# Patient Record
Sex: MALE | Race: WHITE | Employment: FULL TIME | URBAN - METROPOLITAN AREA
[De-identification: names, ages, dates, MRNs, and addresses within clinical notes are randomized per-mention and may not be internally consistent; named-entity substitution may affect disease eponyms.]

---

## 2017-08-27 ENCOUNTER — ANESTHESIA EVENT (OUTPATIENT)
Dept: SURGERY | Facility: HOSPITAL | Age: 42
DRG: 133 | End: 2017-08-27
Payer: COMMERCIAL

## 2017-08-27 NOTE — ANESTHESIA PREPROCEDURE EVALUATION
PRE-OP EVALUATION    Patient Name: Amanda Garcia    Pre-op Diagnosis: OBSTRUCTIVE SLEEP APNEA, CHRONIC TONSILLITIS, HYPERTROPHIC TONSILS AND ADENOIDS    Procedure(s):  UVULOPALATOPHARYNGOPLASTY WITH LASER, ADENOIDECTOMY AND BILATERAL LASER TONSILLECTOMY exam normal.                 Other findings  Pt falling asleep midsentence! ASA: 3   Plan: general  NPO status verified and   Patient has not taken beta blockers in last 24 hours.   Post-procedure pain management plan discussed with surgeon and pat

## 2017-08-28 ENCOUNTER — HOSPITAL ENCOUNTER (OUTPATIENT)
Facility: HOSPITAL | Age: 42
Setting detail: OBSERVATION
Discharge: HOME | DRG: 133 | End: 2017-08-29
Attending: OTOLARYNGOLOGY | Admitting: OTOLARYNGOLOGY
Payer: COMMERCIAL

## 2017-08-28 ENCOUNTER — SURGERY (OUTPATIENT)
Age: 42
End: 2017-08-28

## 2017-08-28 ENCOUNTER — ANESTHESIA (OUTPATIENT)
Dept: SURGERY | Facility: HOSPITAL | Age: 42
DRG: 133 | End: 2017-08-28
Payer: COMMERCIAL

## 2017-08-28 DIAGNOSIS — G47.33 OSA (OBSTRUCTIVE SLEEP APNEA): Primary | ICD-10-CM

## 2017-08-28 LAB
ALLENS TEST: POSITIVE
ALLENS TEST: POSITIVE
ARTERIAL BLD GAS O2 SATURATION: 93 % (ref 92–100)
ARTERIAL BLD GAS O2 SATURATION: 95 % (ref 92–100)
ARTERIAL BLOOD GAS BASE EXCESS: 1.2
ARTERIAL BLOOD GAS BASE EXCESS: 1.6
ARTERIAL BLOOD GAS HCO3: 29.1 MEQ/L (ref 22–26)
ARTERIAL BLOOD GAS HCO3: 29.4 MEQ/L (ref 22–26)
ARTERIAL BLOOD GAS PCO2: 55 MM HG (ref 35–45)
ARTERIAL BLOOD GAS PCO2: 61 MM HG (ref 35–45)
ARTERIAL BLOOD GAS PH: 7.3 (ref 7.35–7.45)
ARTERIAL BLOOD GAS PH: 7.34 (ref 7.35–7.45)
ARTERIAL BLOOD GAS PO2: 88 MM HG (ref 80–105)
ARTERIAL BLOOD GAS PO2: 94 MM HG (ref 80–105)
ATRIAL RATE: 88 BPM
CALCULATED O2 SATURATION: 95 % (ref 92–100)
CALCULATED O2 SATURATION: 97 % (ref 92–100)
CARBOXYHEMOGLOBIN: 1.5 % SAT (ref 0–3)
CARBOXYHEMOGLOBIN: 1.5 % SAT (ref 0–3)
CPAP: 12 CM H2O
EXPIRATORY PRESSURE: 10 CM H2O
FIO2: 30 %
INSP PRESSURE: 16 CM H2O
L/M: 6 L/MIN
METHEMOGLOBIN: 0.7 % SAT (ref 0.4–1.5)
METHEMOGLOBIN: 0.7 % SAT (ref 0.4–1.5)
P AXIS: -11 DEGREES
P-R INTERVAL: 134 MS
PATIENT TEMPERATURE: 97.7 F
PATIENT TEMPERATURE: 99 F
Q-T INTERVAL: 396 MS
QRS DURATION: 110 MS
QTC CALCULATION (BEZET): 479 MS
R AXIS: 77 DEGREES
T AXIS: 53 DEGREES
TOTAL HEMOGLOBIN: 16.7 G/DL (ref 13.2–17.3)
TOTAL HEMOGLOBIN: 17.3 G/DL (ref 13.2–17.3)
VENTRICULAR RATE: 88 BPM

## 2017-08-28 PROCEDURE — 0C53XZZ DESTRUCTION OF SOFT PALATE, EXTERNAL APPROACH: ICD-10-PCS | Performed by: OTOLARYNGOLOGY

## 2017-08-28 PROCEDURE — 0CBNXZZ EXCISION OF UVULA, EXTERNAL APPROACH: ICD-10-PCS | Performed by: OTOLARYNGOLOGY

## 2017-08-28 PROCEDURE — 99223 1ST HOSP IP/OBS HIGH 75: CPT | Performed by: HOSPITALIST

## 2017-08-28 PROCEDURE — 0C5PXZZ DESTRUCTION OF TONSILS, EXTERNAL APPROACH: ICD-10-PCS | Performed by: OTOLARYNGOLOGY

## 2017-08-28 RX ORDER — SODIUM CHLORIDE, SODIUM LACTATE, POTASSIUM CHLORIDE, CALCIUM CHLORIDE 600; 310; 30; 20 MG/100ML; MG/100ML; MG/100ML; MG/100ML
INJECTION, SOLUTION INTRAVENOUS CONTINUOUS
Status: DISCONTINUED | OUTPATIENT
Start: 2017-08-28 | End: 2017-08-29

## 2017-08-28 RX ORDER — LIDOCAINE HYDROCHLORIDE AND EPINEPHRINE 10; 10 MG/ML; UG/ML
INJECTION, SOLUTION INFILTRATION; PERINEURAL AS NEEDED
Status: DISCONTINUED | OUTPATIENT
Start: 2017-08-28 | End: 2017-08-28 | Stop reason: HOSPADM

## 2017-08-28 RX ORDER — LABETALOL HYDROCHLORIDE 5 MG/ML
INJECTION, SOLUTION INTRAVENOUS
Status: COMPLETED
Start: 2017-08-28 | End: 2017-08-28

## 2017-08-28 RX ORDER — LABETALOL HYDROCHLORIDE 5 MG/ML
5 INJECTION, SOLUTION INTRAVENOUS
Status: DISCONTINUED | OUTPATIENT
Start: 2017-08-28 | End: 2017-08-28 | Stop reason: HOSPADM

## 2017-08-28 RX ORDER — NALOXONE HYDROCHLORIDE 0.4 MG/ML
80 INJECTION, SOLUTION INTRAMUSCULAR; INTRAVENOUS; SUBCUTANEOUS AS NEEDED
Status: DISCONTINUED | OUTPATIENT
Start: 2017-08-28 | End: 2017-08-28 | Stop reason: HOSPADM

## 2017-08-28 RX ORDER — ENOXAPARIN SODIUM 100 MG/ML
0.5 INJECTION SUBCUTANEOUS NIGHTLY
Status: DISCONTINUED | OUTPATIENT
Start: 2017-08-28 | End: 2017-08-29

## 2017-08-28 RX ORDER — ACETAMINOPHEN 160 MG/5ML
650 SOLUTION ORAL EVERY 6 HOURS PRN
Status: DISCONTINUED | OUTPATIENT
Start: 2017-08-28 | End: 2017-08-29

## 2017-08-28 RX ORDER — HYDRALAZINE HYDROCHLORIDE 20 MG/ML
10 INJECTION INTRAMUSCULAR; INTRAVENOUS EVERY 4 HOURS PRN
Status: DISCONTINUED | OUTPATIENT
Start: 2017-08-28 | End: 2017-08-29

## 2017-08-28 RX ORDER — SODIUM CHLORIDE, SODIUM LACTATE, POTASSIUM CHLORIDE, CALCIUM CHLORIDE 600; 310; 30; 20 MG/100ML; MG/100ML; MG/100ML; MG/100ML
INJECTION, SOLUTION INTRAVENOUS CONTINUOUS
Status: DISCONTINUED | OUTPATIENT
Start: 2017-08-28 | End: 2017-08-28 | Stop reason: HOSPADM

## 2017-08-28 NOTE — OPERATIVE REPORT
[facility]    PATIENT'S NAME: SHEFALI ROGERSANNE   ATTENDING PHYSICIAN: Elvin Jordan M.D. OPERATING PHYSICIAN: Elvin Jordan M.D.    PATIENT ACCOUNT#:   [de-identified]    LOCATION:  [address] [place] [address]  MEDICAL RECORD #:   [id number]       DATE OF BIRTH: M.D.  d: 08/28/2017 09:13:04  t: 08/28/2017 09:58:37  [facility] [id number]  [facility]/

## 2017-08-28 NOTE — BRIEF OP NOTE
Pre-Operative Diagnosis: OBSTRUCTIVE SLEEP APNEA, CHRONIC TONSILLITIS, HYPERTROPHIC TONSILS AND ADENOIDS     Post-Operative Diagnosis: OBSTRUCTIVE SLEEP APNEA, CHRONIC TONSILLITIS, HYPERTROPHIC TONSILS AND ADENOIDS     Procedure Performed:   Procedure(s

## 2017-08-28 NOTE — PROGRESS NOTES
Patient to transfer to ICU, bed is available at this time. Dr. Antonia Robles covering for Dr. Chisholm, contact has been made  Patient calm/cooperative, moderate pain level, will continue to monitor.

## 2017-08-28 NOTE — CONSULTS
[facility]  [address] [place] [address] Patient Status:  Inpatient    1975 MRN [id number]   [facility] 4SW-A Attending Yael Dixon MD   Hosp Day # 0 PCP No primary care provider on file.      Reason for consult: Medical ysabel PERRLA. Anicteric. Neck: No lymphadenopathy. No JVD. No carotid bruits. Respiratory: Diminished BS b/l  Cardiovascular: S1, S2. Regular rate and rhythm. No murmurs, rubs or gallops. Equal pulses. Chest and Back: No tenderness or deformity. Abdomen:  So

## 2017-08-28 NOTE — H&P
History & Physical Examination    Patient Name: Francisca Donahue  MRN: [id number]  CSN: [id number]  YOB: 1975    Diagnosis: JOCELYN    Present Illness: Patient with severe sleep apnea here for UPPP Laser Tonsillectomy and adneoidectomy.        No prescri

## 2017-08-28 NOTE — ANESTHESIA POSTPROCEDURE EVALUATION
Reema RODAS 67. Atef Patient Status:  Hospital Outpatient Surgery   Age/Gender 37year old male MRN [id number]   [facility] SURGERY Attending Daniele [place] MD   Hosp Day # 0 PCP No primary care provider on file.        Anesthesia

## 2017-08-28 NOTE — CONSULTS
[facility]  Report of Consultation    Best Meyers Patient Status:  Inpatient    1975 MRN [id number]   [facility] 4SW-A Attending Angie Kennedy MD   Hosp Day # 0 PCP No primary care provider on file.      Reason for Consultati pain.  Integument/breast: Negative for rash, skin lesions, and pruritus. Hematologic/lymphatic: Negative for easy bruising, bleeding, and lymphadenopathy. Musculoskeletal: Negative for myalgias, arthralgias, muscle weakness.   Neurological: Negative for h soft, non-tender, non-distended, no masses, no guarding, no     rebound, positive BS. Obese   Extremity: no edema, no cyanosis   Skin: No rashes or lesions.    Neurological: Alert, interactive, no focal deficits    Lab Data Review:  No results for input(s)

## 2017-08-29 VITALS
HEART RATE: 105 BPM | TEMPERATURE: 99 F | OXYGEN SATURATION: 93 % | SYSTOLIC BLOOD PRESSURE: 140 MMHG | DIASTOLIC BLOOD PRESSURE: 73 MMHG | BODY MASS INDEX: 44.1 KG/M2 | RESPIRATION RATE: 26 BRPM | HEIGHT: 71 IN | WEIGHT: 315 LBS

## 2017-08-29 LAB
ALLENS TEST: POSITIVE
ARTERIAL BLD GAS O2 SATURATION: 94 % (ref 92–100)
ARTERIAL BLOOD GAS BASE EXCESS: 4.8
ARTERIAL BLOOD GAS HCO3: 30.2 MEQ/L (ref 22–26)
ARTERIAL BLOOD GAS PCO2: 47 MM HG (ref 35–45)
ARTERIAL BLOOD GAS PH: 7.42 (ref 7.35–7.45)
ARTERIAL BLOOD GAS PO2: 71 MM HG (ref 80–105)
BASOPHILS # BLD AUTO: 0.02 X10(3) UL (ref 0–0.1)
BASOPHILS NFR BLD AUTO: 0.1 %
BUN BLD-MCNC: 23 MG/DL (ref 8–20)
CALCIUM BLD-MCNC: 8.9 MG/DL (ref 8.3–10.3)
CALCULATED O2 SATURATION: 95 % (ref 92–100)
CARBOXYHEMOGLOBIN: 1.3 % SAT (ref 0–3)
CHLORIDE: 102 MMOL/L (ref 101–111)
CO2: 32 MMOL/L (ref 22–32)
CREAT BLD-MCNC: 1.33 MG/DL (ref 0.7–1.3)
EOSINOPHIL # BLD AUTO: 0 X10(3) UL (ref 0–0.3)
EOSINOPHIL NFR BLD AUTO: 0 %
ERYTHROCYTE [DISTWIDTH] IN BLOOD BY AUTOMATED COUNT: 15.4 % (ref 11.5–16)
GLUCOSE BLD-MCNC: 126 MG/DL (ref 70–99)
HCT VFR BLD AUTO: 46 % (ref 37–53)
HGB BLD-MCNC: 15.4 G/DL (ref 13–17)
IMMATURE GRANULOCYTE COUNT: 0.06 X10(3) UL (ref 0–1)
IMMATURE GRANULOCYTE RATIO %: 0.4 %
LYMPHOCYTES # BLD AUTO: 1.83 X10(3) UL (ref 0.9–4)
LYMPHOCYTES NFR BLD AUTO: 12.1 %
MCH RBC QN AUTO: 28.1 PG (ref 27–33.2)
MCHC RBC AUTO-ENTMCNC: 33.5 G/DL (ref 31–37)
MCV RBC AUTO: 83.9 FL (ref 80–99)
METHEMOGLOBIN: 0.6 % SAT (ref 0.4–1.5)
MONOCYTES # BLD AUTO: 0.72 X10(3) UL (ref 0.1–0.6)
MONOCYTES NFR BLD AUTO: 4.8 %
NEUTROPHIL ABS PRELIM: 12.49 X10 (3) UL (ref 1.3–6.7)
NEUTROPHILS # BLD AUTO: 12.49 X10(3) UL (ref 1.3–6.7)
NEUTROPHILS NFR BLD AUTO: 82.6 %
PATIENT TEMPERATURE: 99 F
PLATELET # BLD AUTO: 186 10(3)UL (ref 150–450)
POTASSIUM SERPL-SCNC: 4.6 MMOL/L (ref 3.6–5.1)
RBC # BLD AUTO: 5.48 X10(6)UL (ref 4.3–5.7)
RED CELL DISTRIBUTION WIDTH-SD: 46.5 FL (ref 35.1–46.3)
SODIUM SERPL-SCNC: 137 MMOL/L (ref 136–144)
TOTAL HEMOGLOBIN: 16.1 G/DL (ref 13.2–17.3)
WBC # BLD AUTO: 15.1 X10(3) UL (ref 4–13)

## 2017-08-29 PROCEDURE — 99233 SBSQ HOSP IP/OBS HIGH 50: CPT | Performed by: HOSPITALIST

## 2017-08-29 NOTE — PLAN OF CARE
Pt on room air with oxygen sat WNL. Tolerated bipap over night. Taking in clear liquids. Plan for dietitian to see. [id number] Maria Elena Maradiaga for discharger per surgery and critical care.         RESPIRATORY - ADULT    • Achieves optimal ventilation and oxygenation Progressing

## 2017-08-29 NOTE — PROGRESS NOTES
[facility]  Progress Note     Judah Ga Patient Status:  Inpatient    1975 MRN [id number]   [facility] 4SW-A Attending Elisabet Packer MD   Hosp Day # 1 PCP No primary care provider on file.      Chief Complaint: Medical man recommend close outpt f/u  4.  Morbid obesity, BMI 44    Plan of care: d/c home today    Quality:  · DVT Prophylaxis: SCDs  · CODE status: Full  · Herring: no  · Central line: no    Estimated date of discharge: 8/28  Discharge is dependent on: progress  At th

## 2017-08-29 NOTE — DIETARY NOTE
Nutrition Short Note    Dietitian consult received. Weight loss diet education completed with pt and his wife. Encouraged pt sign up with weight loss clinic or set up appointment with Outpatient Dietitian.      Candelario [place] MS, RDN, LDN  Pager [phone]

## 2017-08-29 NOTE — RESPIRATORY THERAPY NOTE
JOCELYN Equipment Usage Summary :            Set Mode :AUTO CPAP W FLEX          Usage in Hours:1;40          90% Pressure (EPAP) :15             90% Insp Pressure (IPAP);           AHI : 27.6          Supplemental Oxygen :      LPM

## 2017-08-29 NOTE — PROGRESS NOTES
[facility]  Progress Note    Ambar Erp Patient Status:  Inpatient    1975 MRN [id number]   [facility] 4SW-A Attending Odilia Gregory MD   Hosp Day # 1 PCP No primary care provider on file. Subjective:   Ambar Erp is a(n) 1251   ABGPHT  7.34*   JVEGIR9C  55*   OLTUS7B  94   ABGHCO3  29.1*   ABGBE  1.6   TEMP  97.7   LUIS ENRIQUE  Positive   SITE  Right Radial   DEV  Bi-PAP   THGB  17.3         Cultures: No positive cultures, MRSA screen pending    Radiology: No films    Medication

## 2017-08-29 NOTE — PLAN OF CARE
Alert and oriented, congenial.  Lungs diminished on BiPAP, to room air for ambulation in room and sitting in chair. Plan to replace BiPAP when back in bed or if sleepy, patient is in agreement, will call if he gets sleepy also.   Clear liquids - tolerating

## 2017-08-29 NOTE — PLAN OF CARE
Received pt. On BIPAP at 30%. Drowsy but arousable and is alert and oriented x4. Lungs diminshed. ESCOBEDO's. Tylenol given for pain with good results. Productive cough of thick clear sputum with red flicks.   Started on clear liquid diet and tolerated well

## 2017-08-29 NOTE — PROGRESS NOTES
[facility] Progress Note:  Anticoagulation Weight Dose Adjustment for enoxaparin (LOVENOX)    Amanda Garcia is a 37year old male who has been prescribed enoxaparin (LOVENOX) 40 mg subcutaneously every 24 hours for VTE prophylaxis.       [facility] [facility] from [place]

## 2017-08-29 NOTE — PROGRESS NOTES
Pt seen by surgery and critical care. Ok to be discharged home. IV removed, instructions and follow up info reviewed.

## 2017-08-29 NOTE — PAYOR COMM NOTE
--------------  ADMISSION REVIEW     Payor: [employer]  Subscriber #:  [id number]  Authorization Number: [id number]    Admit date: 8/28/17  Admit time: 1040       Admitting Physician: Yudelka Anna MD  Attending Physician:  Lesia att. providers f adenoids  1. S/p uvulopalatopharngoplasty and bilateral tonsillectomy  2. Pain control  3. ENT following  2. Acute hypercapnic respiratory failure, likely due to baseline JOCELYN with anesthesia effect                1. On Bipap  2.  F/u ABG  3. Elevated BP, li

## 2017-08-29 NOTE — H&P
[facility]    PATIENT'S NAME: CAITY ROGERS   ATTENDING PHYSICIAN: Rojelio Whitt M.D.    PATIENT ACCOUNT#:   [de-identified]    LOCATION:  [address] [place] [address]  MEDICAL RECORD #:   [id number]       YOB: 1975  ADMISSION DATE:       08/28/2017

## 2017-08-29 NOTE — PROGRESS NOTES
POD#1   Pt. States he feels great minimal pain. Some minor desats thru night no arrythmias. Discharge to home F/U 1 week he has po tonsils discharge.

## 2017-08-30 NOTE — PAYOR COMM NOTE
--------------  DISCHARGE REVIEW    Payor: [employer]  Subscriber #:  [id number]  Authorization Number: [id number]    Admit date: 8/28/17  Admit time:  2509  Discharge Date: 8/29/2017 12:10 PM     Admitting Physician: Angie Kennedy MD  Attendi No results for input(s): TROP, CK in the last 72 hours.           Imaging: Imaging data reviewed in Epic.     Medications:       ASSESSMENT / PLAN:      1. JOCELYN, chronic tonsillitis, hypertrophic tonsils, adenoids  1.  S/p uvulopalatopharngoplasty and bilate

## (undated) DEVICE — GLOVE SURG SENSICARE SZ 7-1/2

## (undated) DEVICE — SOL  .9 1000ML BTL

## (undated) DEVICE — THROAT PACKING X-RAY DETECT

## (undated) DEVICE — T & A CDS: Brand: MEDLINE INDUSTRIES, INC.

## (undated) DEVICE — VIOLET BRAIDED (POLYGLACTIN 910), SYNTHETIC ABSORBABLE SUTURE: Brand: COATED VICRYL

## (undated) DEVICE — SUTURE VICRYL 4-0 SH

## (undated) DEVICE — ENT COBLATOR II, PROCISE XP WAND: Brand: COBLATION

## (undated) DEVICE — SOL H2O 1000ML BTL

## (undated) DEVICE — BASIC DOUBLE BASIN 1-LF: Brand: MEDLINE INDUSTRIES, INC.

## (undated) DEVICE — SUTURE VICRYL 3-0 SH

## (undated) DEVICE — MEDI-VAC NON-CONDUCTIVE SUCTION TUBING: Brand: CARDINAL HEALTH

## (undated) DEVICE — KENDALL SCD EXPRESS SLEEVES, KNEE LENGTH, MEDIUM: Brand: KENDALL SCD

## (undated) DEVICE — NEEDLE SPINAL 25X3-1/2 BLUE